# Patient Record
Sex: FEMALE | Race: WHITE
[De-identification: names, ages, dates, MRNs, and addresses within clinical notes are randomized per-mention and may not be internally consistent; named-entity substitution may affect disease eponyms.]

---

## 2019-10-05 VITALS
SYSTOLIC BLOOD PRESSURE: 144 MMHG | SYSTOLIC BLOOD PRESSURE: 144 MMHG | DIASTOLIC BLOOD PRESSURE: 71 MMHG | DIASTOLIC BLOOD PRESSURE: 71 MMHG

## 2019-10-05 NOTE — PHYS DOC
Past History


Past Medical History:  High Cholesterol, Hypertension


Past Surgical History:  Knee Replacement


Alcohol Use:  None


Drug Use:  None





Adult General


Chief Complaint


Chief Complaint:  FACE PROBLEM





HPI


HPI


79-year-old female presents with lip and facial swelling. The patient started 

noticing swelling today on the left side of her face around 10:30 AM. She has 

taken 3 doses of 25 mg Benadryl without relief. It is not pruritic. The patient 

has not been bit, stone, or ingested any unusual or new substances. She is on 

lisinopril. She has been on this for many years. She is having no difficulty 

breathing. Her tongue does not feel swollen. She denies fever or chills.





Review of Systems


Review of Systems





Constitutional: Denies fever or chills []


Eyes: Denies change in visual acuity, redness, or eye pain []


HENT: Facial swelling[]


Respiratory: Denies cough or shortness of breath []


Cardiovascular: No additional information not addressed in HPI []


GI: Denies abdominal pain, nausea, vomiting, bloody stools or diarrhea []


: Denies dysuria or hematuria []


Musculoskeletal: Denies back pain or joint pain []


Integument: Denies rash or skin lesions []


Neurologic: Denies headache, focal weakness or sensory changes []


Endocrine: Denies polyuria or polydipsia []





All other systems were reviewed and found to be within normal limits, except as 

documented in this note.





Current Medications


Current Medications





Current Medications








 Medications


  (Trade)  Dose


 Ordered  Sig/Latia  Start Time


 Stop Time Status Last Admin


Dose Admin


 


 Methylprednisolone


 Sodium Succinate


  (SOLU-Medrol


 125MG VIAL)  125 mg  1X  ONCE  10/5/19 18:30


 10/5/19 18:31 DC 10/5/19 18:27


125 MG


 


 Sodium Chloride  1,000 ml @ 


 1,000 mls/hr  1X  ONCE  10/5/19 18:30


 10/5/19 19:29  10/5/19 18:27


1,000 MLS/HR











Allergies


Allergies





Allergies








Coded Allergies Type Severity Reaction Last Updated Verified


 


  No Known Drug Allergies    10/5/19 No











Physical Exam


Physical Exam





Constitutional: Well developed, well nourished, no acute distress, non-toxic 

appearance. []


HENT: Swelling of the left face and upper lip, tongue normal, airway patent.[]


Eyes: PERRLA, EOMI, conjunctiva normal, no discharge. [] 


Neck: Normal range of motion, no tenderness, supple, no stridor. [] 


Cardiovascular:Heart rate regular rhythm, no murmur []


Lungs & Thorax:  Bilateral breath sounds clear to auscultation []


Abdomen: Bowel sounds normal, soft, no tenderness, no masses, no pulsatile 

masses. [] 


Skin: Warm, dry, no erythema, no rash. [] 


Back: No tenderness, no CVA tenderness. [] 


Extremities: No tenderness, no cyanosis, no clubbing, ROM intact, no edema. [] 


Neurologic: Alert and oriented X 3, normal motor function, normal sensory 

function, no focal deficits noted. []


Psychologic: Affect normal, judgement normal, mood normal. []





Current Patient Data


Vital Signs





                                   Vital Signs








  Date Time  Temp Pulse Resp B/P (MAP) Pulse Ox O2 Delivery O2 Flow Rate FiO2


 


10/5/19 18:32 98.0 92 22  98 Room Air  








Lab Results





                                Laboratory Tests








Test


 10/5/19


18:24


 


White Blood Count


 4.8 x10^3/uL


(4.0-11.0)


 


Red Blood Count


 3.99 x10^6/uL


(3.50-5.40)


 


Hemoglobin


 14.2 g/dL


(12.0-15.5)


 


Hematocrit


 40.5 %


(36.0-47.0)


 


Mean Corpuscular Volume


 102 fL


()  H


 


Mean Corpuscular Hemoglobin


 36 pg (25-35)


H


 


Mean Corpuscular Hemoglobin


Concent 35 g/dL


(31-37)


 


Red Cell Distribution Width


 12.8 %


(11.5-14.5)


 


Platelet Count


 294 x10^3/uL


(140-400)


 


Neutrophils (%) (Auto) 62 % (31-73)  


 


Lymphocytes (%) (Auto) 24 % (24-48)  


 


Monocytes (%) (Auto) 12 % (0-9)  H


 


Eosinophils (%) (Auto) 1 % (0-3)  


 


Basophils (%) (Auto) 1 % (0-3)  


 


Neutrophils # (Auto)


 3.0 x10^3uL


(1.8-7.7)


 


Lymphocytes # (Auto)


 1.1 x10^3/uL


(1.0-4.8)


 


Monocytes # (Auto)


 0.6 x10^3/uL


(0.0-1.1)


 


Eosinophils # (Auto)


 0.1 x10^3/uL


(0.0-0.7)


 


Basophils # (Auto)


 0.1 x10^3/uL


(0.0-0.2)











EKG


EKG


[]





Radiology/Procedures


Radiology/Procedures


[]





Course & Med Decision Making


Course & Med Decision Making


Pertinent Labs and Imaging studies reviewed. (See chart for details)


The patient's labs are remarkable except for an elevated MCV of 102. I have 

given the patient 1 L normal saline, 50 mg of Benadryl IV, and 125 mg of Solu-

Medrol. I believe this is angioedema due to lisinopril. I will discontinue this 

medication and give the patient a prescription for hydrochlorothiazide alone as 

well as 3 more days of prednisone.  She will follow up with her primary care 

physician when she goes back home to [place] next week. The patient has had 

some improvement in the left-sided facial swelling. She still has a swollen 

upper lip. She has had no difficulty breathing throughout her stay in the ED. 

She would like to go home. She is stable for discharge at this time.


[]





Dragon Disclaimer


Dragon Disclaimer


This electronic medical record was generated, in whole or in part, using a voice

 recognition dictation system.





Departure


Departure:


Impression:  


   Primary Impression:  


   Angioedema


Disposition:  01 HOME, SELF-CARE


Condition:  STABLE


Referrals:  


PCP,UNKNOWN (PCP)


Patient Instructions:  Angioedema, Easy-to-Read


Scripts


Prednisone (PREDNISONE) 10 Mg Tablet


40 MG PO DAILY for angioedema for 3 Days, #12 TAB


   Prov: CAROLINE CLEMENTS DO         10/5/19 


Hydrochlorothiazide (HYDROCHLOROTHIAZIDE TABLET  **) 25 Mg Tablet


25 MG PO DAILY for hypertension for 14 Days, #14 TAB 0 Refills


   Prov: CAROLINE CLEMENTS DO         10/5/19





Problem Qualifiers








   Primary Impression:  


   Angioedema


   Encounter type:  initial encounter  Qualified Codes:  T78.3XXA - 

   Angioneurotic edema, initial encounter








CAROLINE CLEMENTS DO                  Oct 5, 2019 18:54

## 2020-11-24 ENCOUNTER — HOSPITAL ENCOUNTER (OUTPATIENT)
Dept: HOSPITAL 63 - DXRAD | Age: 80
End: 2020-11-24
Attending: FAMILY MEDICINE
Payer: MEDICARE
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
## 2020-11-24 DIAGNOSIS — S83.002A: Primary | ICD-10-CM

## 2020-11-24 DIAGNOSIS — M17.12: ICD-10-CM
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
## 2020-11-24 DIAGNOSIS — M25.462: ICD-10-CM

## 2020-11-24 PROCEDURE — 73564 X-RAY EXAM KNEE 4 OR MORE: CPT

## 2020-11-24 NOTE — RAD
4 views left knee without comparison for left knee pain, concern regarding

the patella.

 

FINDINGS: There is no fracture, dislocation, or acute osseous abnormality 

identified. There is mild lateral subluxation of the patella. A 

suprapatellar joint effusion is present. There is narrowing of the 

patellofemoral joint compartment, and there are degenerative changes of 

the lateral joint compartment as well, with mild narrowing and prominent 

osteophyte formation.

 

IMPRESSION:

1. No fracture or acute osseous abnormality.

2. Suprapatellar joint effusion.

3. Mild subluxation of the patella laterally.

4. Degenerative changes.

 

Electronically signed by: Aleksander Hernandez MD (11/24/2020 3:03 PM) [id number]

## 2021-05-06 ENCOUNTER — HOSPITAL ENCOUNTER (OUTPATIENT)
Dept: HOSPITAL 63 - MAMMO | Age: 81
End: 2021-05-06
Attending: FAMILY MEDICINE
Payer: MEDICARE

## 2021-05-06 DIAGNOSIS — Z00.00: ICD-10-CM

## 2021-05-06 DIAGNOSIS — N95.9: ICD-10-CM

## 2021-05-06 DIAGNOSIS — Z12.31: Primary | ICD-10-CM

## 2021-05-06 DIAGNOSIS — N95.1: ICD-10-CM

## 2021-05-06 PROCEDURE — 77067 SCR MAMMO BI INCL CAD: CPT

## 2021-05-06 PROCEDURE — 77080 DXA BONE DENSITY AXIAL: CPT

## 2021-05-07 NOTE — RAD
INDICATION: Screening for osteopenia/osteoporosis.  Postmenopausal evaluation.



COMPARISON: None.



TECHNIQUE:  Bone densitometry was performed through the lumbar spine and proximal femur.



IMPRESSION: 



Lumbar Spine: 

BMD: 1.5

T-Score: 2.7

Range: Normal 



Proximal Femur:

BMD: 0.78

T-Score: -1.4

Range: Osteopenic 







World Health Organization Criteria for Bone Density:



T-Score:

> -1.0: Normal Range

< -1.0 to -2.5:  Osteopenic Range

< -2.5: Osteoporotic Range



Electronically signed by: Darryn Cedillo MD (5/7/2021 3:59 AM) [id number]

## 2021-05-18 NOTE — RAD
EXAM: Bilateral screening mammogram.



HISTORY: 81-year-old female presents for screening mammography.



TECHNIQUE: Full-field digital craniocaudal and mediolateral oblique views of both breasts are obtaine
d for evaluation. Computer aided detection was applied.



COMPARISON: There is no prior study available for comparison. This exam serves as a new baseline mamm
ogram.



BREAST PARENCHYMAL DENSITY: Level C - Heterogeneously dense.



FINDINGS: There is no suspicious mass, microcalcification or region of architectural distortion.



IMPRESSION: BI-RADS Category 2: Benign finding(s). 



RECOMMENDATION: Annual mammography is recommended.



If your mammogram demonstrates that you have dense breast tissue, which could hide abnormalities, and
 if you have other risk factors for breast cancer that have been identified, you might benefit from s
upplemental screening tests that may be suggested by your ordering physician.  Dense breast tissue, i
n and of itself, is a relatively common condition.  This information is not provided to cause undue c
oncern, but rather to raise your awareness and to promote discussion with your physician regarding th
e presence of other risk factors, in addition to dense breast tissue. A report of your mammography re
sults will be sent to you and your physician.  You should contact your physician if you have any ques
tions or concerns regarding this report.  



Mammography is a sensitive method for finding small breast cancers, but it does not detect them all a
nd is not a substitute for careful clinical examination.  A negative mammogram does not negate a clin
ically suspicious finding and should not result in delay in biopsying a clinically suspicious abnorma
lity.



PQRS compliance statement -  Patient information was entered into a reminder system with a target due
 date for the next mammogram. 



"Our facility is accredited by the [employer] Mammography Program."



Electronically signed by: Carrie Fowler MD (5/18/2021 11:49 AM) [id number]

## 2022-05-30 ENCOUNTER — HOSPITAL ENCOUNTER (EMERGENCY)
Dept: HOSPITAL 63 - ER | Age: 82
Discharge: HOME | End: 2022-05-30
Payer: MEDICARE

## 2022-05-30 VITALS — SYSTOLIC BLOOD PRESSURE: 145 MMHG | DIASTOLIC BLOOD PRESSURE: 100 MMHG

## 2022-05-30 VITALS — WEIGHT: 175.27 LBS | BODY MASS INDEX: 25.96 KG/M2 | HEIGHT: 69 IN

## 2022-05-30 DIAGNOSIS — E78.00: ICD-10-CM

## 2022-05-30 DIAGNOSIS — R22.42: ICD-10-CM

## 2022-05-30 DIAGNOSIS — M25.562: Primary | ICD-10-CM

## 2022-05-30 DIAGNOSIS — I10: ICD-10-CM

## 2022-05-30 DIAGNOSIS — Z88.8: ICD-10-CM

## 2022-05-30 PROCEDURE — 73562 X-RAY EXAM OF KNEE 3: CPT

## 2022-05-30 PROCEDURE — 99283 EMERGENCY DEPT VISIT LOW MDM: CPT

## 2022-05-30 NOTE — RAD
XR KNEE _3 VIEWS_LT



History: Reason: knee pain, "buckled" / Spl. Instructions:  / History: 



Technique: 3 views left knee



Comparison: None.



Findings:

No dislocation. No acute fracture. Advanced left knee degenerative changes most prominent within the 
lateral compartment. No significant knee joint effusion although evaluation is degraded on lateral vi
ew due to positioning. Chondrocalcinosis is noted.



Impression: 

1.  No acute osseous abnormality.

2.  Advanced left knee DJD with chondrocalcinosis.



Electronically signed by: Celio Nunn DO (5/30/2022 6:46 PM) BETH

## 2022-05-30 NOTE — PHYS DOC
Past History


Past Medical History:  High Cholesterol, Hypertension


 (FRANCHESCA GRAY)


Past Surgical History:  Knee Replacement


Additional Past Surgical Histo:  RIGHT


 (FRANCHESCA GRAY)


Alcohol Use:  None


Drug Use:  None


 (FRANCHESCA GRAY)





General Adult


EDM:


Chief Complaint:  KNEE INJURY





HPI:


HPI:


Patient is an 82-year-old female who presents to the emergency department for 

left posterior knee pain.  Patient reports that 2 years ago she was experiencing

some knee pain and had an x-ray performed in the doctor told her that she had 

arthritis.  She reports that she was getting up out of her chair today when she 

experienced a sharp pain in the back of her knee and her knee buckled and she 

caught herself prior to falling.  She reports that she was able to bear weight 

and ambulate following with the buckling of the knee occurred 6 more times 

throughout the day.  Patient denies any pain at rest but reports pain that is 7 

out of 10 when the buckling does occur.  She denies any decreased range of 

motion or decreased sensation in her extremity.


 (FRANCHESCA GRAY)





Review of Systems:


Review of Systems:





Musculoskeletal: See HPI


Integument: See HPI


Neurologic: See HPI


 (FRANCHESCA GRAY)





Allergies:


Allergies:





Allergies








Coded Allergies Type Severity Reaction Last Updated Verified


 


  lisinopril Allergy Intermediate  5/30/22 Yes








 (FRANCHESCA GRAY)





Physical Exam:


PE:





Constitutional: Well developed, well nourished, no acute distress, non-toxic 

appearance. []


HENT: Normocephalic, atraumatic, bilateral external ears normal, oropharynx 

moist, no oral exudates, nose normal. []


Eyes: PERRL, EOMI, conjunctiva normal, no discharge. [] 


Neck: Normal range of motion, no stridor


Cardiovascular normal peripheral perfusion


Lungs & Thorax: Normal work of breathing, no tachypnea


Abdomen: Soft and flat


Skin: Warm, dry, no erythema, no rash. [] 


Back: No tenderness, normal range of motion


Extremities: No tenderness, no cyanosis, no clubbing, ROM intact, no edema. [] 

Left knee: Swelling noted to anterior aspect of left knee, pain with palpation 

to posterior aspect of left knee, no obvious deformity, no joint laxity, no 

crepitus, range of motion intact, neuro intact


Neurologic: Alert and oriented X 3, normal motor function, normal sensory 

function, no focal deficits noted. []


Psychologic: Affect normal, judgement normal, mood normal. []


 (FRANCHESCA GRAY)





Current Patient Data:


Vital Signs:





                                   Vital Signs








  Date Time  Temp Pulse Resp B/P (MAP) Pulse Ox O2 Delivery O2 Flow Rate FiO2


 


5/30/22 17:19 97.6 98 18 159/96 (117) 96 Room Air  








 (FRANCHESCA GRAY)





EKG:


EKG:


[]


 (FRANCHESCA GRAY)





Radiology/Procedures:


Radiology/Procedures:


[]


 (FRANCHESCA GRAY)





Heart Score:


C/O Chest Pain:  N/A


Risk Factors:


Risk Factors:  DM, Current or recent (<one month) smoker, HTN, HLP, family 

history of CAD, obesity.


Risk Scores:


Score 0 - 3:  2.5% MACE over next 6 weeks - Discharge Home


Score 4 - 6:  20.3% MACE over next 6 weeks - Admit for Clinical Observation


Score 7 - 10:  72.7% MACE over next 6 weeks - Early Invasive Strategies


 (FRANCHESCA GRAY)





Course & Med Decision Making:


Course & Med Decision Making


Pertinent Labs and Imaging studies reviewed. (See chart for details)





[] Patient presents to the emergency department for left posterior knee pain she

 got up and her knee buckled several times.  Imaging was performed of the left 

knee.  Imaging did not show any acute fractures there are arthritic changes in 

her knee as read by supervising physician.  Patient's knee was placed in Ace 

wrap to help with swelling.  She was educated on the RICE protocol.  She is 

advised to take anti-inflammatory medications at home.  Advised to follow-up 

with her primary care provider for outpatient MRI if she continues to have pain.

  I discussed with patient all findings and diagnostic testing as well as the 

need to follow-up with PCP for further evaluation and treatment or return to the

 ER if any new or worsening symptoms.  Strict return precautions were also 

discussed at length.  Patient voiced understanding and agreement with the plan. 

 Patient is hemodynamically stable at the time of disposition.


 (FRANCHESCA GRAY)


Course & Med Decision Making


Did not see or evaluate patient.  Did not discuss patient with NP.  Generally 

agree with NP's work-up and disposition per note


 (NISHA THOMSON MD)


Dragon Disclaimer:


Dragon Disclaimer:


This electronic medical record was generated, in whole or in part, using a voice

 recognition dictation system.


 (FRANCHESCA GRAY)





Departure


Departure:


Impression:  


   Primary Impression:  


   Knee pain


   Qualified Codes:  M25.562 - Pain in left knee


Disposition:  01 HOME / SELF CARE / HOMELESS


Condition:  GOOD


Referrals:  


KEM MCCRARY MD (PCP)


Patient Instructions:  RICE - Routine Care for Injuries





Additional Instructions:  


You are seen in the emergency department today for left knee pain.  Your x-ray 

did show arthritic changes but no acute fracture.  Your knee was placed in an 

Ace wrap to help with pain and swelling.   Your symptoms may be improved by 

something called the rice protocol.  This is rest, ice, compression, elevation. 

 Please follow-up when doing intense exercises that may make the pain worse.  

Sometimes gentle stretching can provide relief, but be careful to injury.  It is

 important to perform gentle range of motion exercises to prevent stiff joints 

and chronic pain.  Use ice packs over the affected areas to help decrease your 

pain.  For the first 24 hours you can apply ice 20 minutes on 20 minutes off for

  4 times per day.  Sometimes compression such as the use of an Ace wrap can 

help with the swelling.  You may also elevate the affected area to help with the

 swelling.  Take Tylenol and ibuprofen at home for pain.  Please follow-up with 

your primary care provider tomorrow regarding your ER visit.  If your pain gets 

worse or persist you may need outpatient MRI.  Please be careful with ambulation

 please use a cane or a walker.  Return to the emergency department if you 

develop any new injuries or falls, decreased range of motion or decreased 

sensation in your extremity.











FRANCHESCA GRAY          May 30, 2022 17:58


NISHA THOMSON MD               May 30, 2022 19:18